# Patient Record
Sex: FEMALE | Race: BLACK OR AFRICAN AMERICAN | NOT HISPANIC OR LATINO | Employment: UNEMPLOYED | URBAN - METROPOLITAN AREA
[De-identification: names, ages, dates, MRNs, and addresses within clinical notes are randomized per-mention and may not be internally consistent; named-entity substitution may affect disease eponyms.]

---

## 2022-08-17 ENCOUNTER — LAB REQUISITION (OUTPATIENT)
Dept: LAB | Facility: CLINIC | Age: 77
End: 2022-08-17
Payer: COMMERCIAL

## 2022-08-17 DIAGNOSIS — R39.9 UNSPECIFIED SYMPTOMS AND SIGNS INVOLVING THE GENITOURINARY SYSTEM: ICD-10-CM

## 2022-08-17 PROCEDURE — 87086 URINE CULTURE/COLONY COUNT: CPT | Mod: ORL | Performed by: PHYSICIAN ASSISTANT

## 2022-08-19 LAB — BACTERIA UR CULT: NORMAL

## 2022-11-08 ENCOUNTER — HOSPITAL ENCOUNTER (EMERGENCY)
Facility: CLINIC | Age: 77
Discharge: HOME | End: 2022-11-08
Attending: EMERGENCY MEDICINE | Admitting: EMERGENCY MEDICINE
Payer: COMMERCIAL

## 2022-11-08 VITALS
DIASTOLIC BLOOD PRESSURE: 96 MMHG | SYSTOLIC BLOOD PRESSURE: 166 MMHG | TEMPERATURE: 98.2 F | BODY MASS INDEX: 29.37 KG/M2 | WEIGHT: 172 LBS | OXYGEN SATURATION: 97 % | HEART RATE: 95 BPM | RESPIRATION RATE: 18 BRPM | HEIGHT: 64 IN

## 2022-11-08 DIAGNOSIS — T24.211A PARTIAL THICKNESS BURN OF RIGHT THIGH, INITIAL ENCOUNTER: ICD-10-CM

## 2022-11-08 PROCEDURE — 90714 TD VACC NO PRESV 7 YRS+ IM: CPT | Performed by: EMERGENCY MEDICINE

## 2022-11-08 PROCEDURE — 250N000011 HC RX IP 250 OP 636: Performed by: EMERGENCY MEDICINE

## 2022-11-08 PROCEDURE — 250N000009 HC RX 250: Performed by: EMERGENCY MEDICINE

## 2022-11-08 PROCEDURE — 16020 DRESS/DEBRID P-THICK BURN S: CPT | Performed by: EMERGENCY MEDICINE

## 2022-11-08 PROCEDURE — 99284 EMERGENCY DEPT VISIT MOD MDM: CPT | Performed by: EMERGENCY MEDICINE

## 2022-11-08 PROCEDURE — 90471 IMMUNIZATION ADMIN: CPT | Performed by: EMERGENCY MEDICINE

## 2022-11-08 PROCEDURE — 99283 EMERGENCY DEPT VISIT LOW MDM: CPT | Mod: 25 | Performed by: EMERGENCY MEDICINE

## 2022-11-08 RX ORDER — OXYCODONE HYDROCHLORIDE 5 MG/1
5 TABLET ORAL EVERY 8 HOURS PRN
Qty: 12 TABLET | Refills: 0 | Status: SHIPPED | OUTPATIENT
Start: 2022-11-08 | End: 2022-11-11

## 2022-11-08 RX ORDER — BACITRACIN ZINC 500 [USP'U]/G
OINTMENT TOPICAL ONCE
Status: COMPLETED | OUTPATIENT
Start: 2022-11-08 | End: 2022-11-08

## 2022-11-08 RX ADMIN — CLOSTRIDIUM TETANI TOXOID ANTIGEN (FORMALDEHYDE INACTIVATED) AND CORYNEBACTERIUM DIPHTHERIAE TOXOID ANTIGEN (FORMALDEHYDE INACTIVATED) 0.5 ML: 5; 2 INJECTION, SUSPENSION INTRAMUSCULAR at 15:43

## 2022-11-08 RX ADMIN — BACITRACIN ZINC: 500 OINTMENT TOPICAL at 15:47

## 2022-11-08 ASSESSMENT — ACTIVITIES OF DAILY LIVING (ADL): ADLS_ACUITY_SCORE: 35
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
## 2024-10-07 ENCOUNTER — OFFICE VISIT (OUTPATIENT)
Dept: FAMILY MEDICINE | Facility: CLINIC | Age: 79
End: 2024-10-07
Payer: COMMERCIAL

## 2024-10-07 VITALS
SYSTOLIC BLOOD PRESSURE: 119 MMHG | OXYGEN SATURATION: 99 % | HEIGHT: 62 IN | HEART RATE: 75 BPM | WEIGHT: 177.6 LBS | BODY MASS INDEX: 32.68 KG/M2 | DIASTOLIC BLOOD PRESSURE: 78 MMHG | RESPIRATION RATE: 12 BRPM

## 2024-10-07 DIAGNOSIS — Z76.89 ENCOUNTER TO ESTABLISH CARE: Primary | ICD-10-CM

## 2024-10-07 DIAGNOSIS — K21.9 GASTROESOPHAGEAL REFLUX DISEASE WITHOUT ESOPHAGITIS: ICD-10-CM

## 2024-10-07 DIAGNOSIS — Z00.00 HEALTHCARE MAINTENANCE: ICD-10-CM

## 2024-10-07 DIAGNOSIS — E04.1 THYROID NODULE: ICD-10-CM

## 2024-10-07 DIAGNOSIS — R51.9 SCALP TENDERNESS: ICD-10-CM

## 2024-10-07 DIAGNOSIS — G44.209 TENSION HEADACHE: ICD-10-CM

## 2024-10-07 DIAGNOSIS — R53.83 OTHER FATIGUE: ICD-10-CM

## 2024-10-07 DIAGNOSIS — I10 BENIGN ESSENTIAL HYPERTENSION: ICD-10-CM

## 2024-10-07 LAB
ALBUMIN SERPL BCG-MCNC: 4 G/DL (ref 3.5–5.2)
ALP SERPL-CCNC: 101 U/L (ref 40–150)
ALT SERPL W P-5'-P-CCNC: 16 U/L (ref 0–50)
ANION GAP SERPL CALCULATED.3IONS-SCNC: 12 MMOL/L (ref 7–15)
AST SERPL W P-5'-P-CCNC: 27 U/L (ref 0–45)
BILIRUB SERPL-MCNC: 0.2 MG/DL
BUN SERPL-MCNC: 16.4 MG/DL (ref 8–23)
CALCIUM SERPL-MCNC: 9.1 MG/DL (ref 8.8–10.4)
CHLORIDE SERPL-SCNC: 100 MMOL/L (ref 98–107)
CHOLEST SERPL-MCNC: 239 MG/DL
CREAT SERPL-MCNC: 0.66 MG/DL (ref 0.51–0.95)
CRP SERPL-MCNC: 28.7 MG/L
EGFRCR SERPLBLD CKD-EPI 2021: 89 ML/MIN/1.73M2
ERYTHROCYTE [SEDIMENTATION RATE] IN BLOOD BY WESTERGREN METHOD: 37 MM/HR (ref 0–30)
FASTING STATUS PATIENT QL REPORTED: NO
FASTING STATUS PATIENT QL REPORTED: NO
GLUCOSE SERPL-MCNC: 91 MG/DL (ref 70–99)
HCO3 SERPL-SCNC: 24 MMOL/L (ref 22–29)
HDLC SERPL-MCNC: 47 MG/DL
LDLC SERPL CALC-MCNC: 172 MG/DL
NONHDLC SERPL-MCNC: 192 MG/DL
POTASSIUM SERPL-SCNC: 4.1 MMOL/L (ref 3.4–5.3)
PROT SERPL-MCNC: 7.4 G/DL (ref 6.4–8.3)
SODIUM SERPL-SCNC: 136 MMOL/L (ref 135–145)
TRIGL SERPL-MCNC: 102 MG/DL
TSH SERPL DL<=0.005 MIU/L-ACNC: 3.53 UIU/ML (ref 0.3–4.2)
VIT B12 SERPL-MCNC: 566 PG/ML (ref 232–1245)

## 2024-10-07 PROCEDURE — 82607 VITAMIN B-12: CPT

## 2024-10-07 PROCEDURE — 86140 C-REACTIVE PROTEIN: CPT

## 2024-10-07 PROCEDURE — 85652 RBC SED RATE AUTOMATED: CPT

## 2024-10-07 PROCEDURE — 80061 LIPID PANEL: CPT

## 2024-10-07 PROCEDURE — 99204 OFFICE O/P NEW MOD 45 MIN: CPT | Mod: GC

## 2024-10-07 PROCEDURE — 36415 COLL VENOUS BLD VENIPUNCTURE: CPT

## 2024-10-07 PROCEDURE — 84443 ASSAY THYROID STIM HORMONE: CPT

## 2024-10-07 PROCEDURE — 80053 COMPREHEN METABOLIC PANEL: CPT

## 2024-10-07 RX ORDER — LISINOPRIL AND HYDROCHLOROTHIAZIDE 10; 12.5 MG/1; MG/1
1 TABLET ORAL DAILY
COMMUNITY
End: 2024-10-07

## 2024-10-07 RX ORDER — LISINOPRIL AND HYDROCHLOROTHIAZIDE 10; 12.5 MG/1; MG/1
1 TABLET ORAL DAILY
Qty: 90 TABLET | Refills: 3 | Status: SHIPPED | OUTPATIENT
Start: 2024-10-07

## 2024-10-07 RX ORDER — PANTOPRAZOLE SODIUM 40 MG/1
40 TABLET, DELAYED RELEASE ORAL DAILY
Qty: 60 TABLET | Refills: 2 | Status: SHIPPED | OUTPATIENT
Start: 2024-10-07

## 2024-10-07 RX ORDER — SENNOSIDES 8.6 MG
1 CAPSULE ORAL EVERY 8 HOURS PRN
COMMUNITY
Start: 2023-09-26

## 2024-10-07 RX ORDER — ACETAMINOPHEN 500 MG
500-1000 TABLET ORAL EVERY 6 HOURS PRN
Qty: 180 TABLET | Refills: 3 | Status: SHIPPED | OUTPATIENT
Start: 2024-10-07

## 2024-10-07 RX ORDER — LORATADINE 10 MG/1
1 TABLET ORAL
COMMUNITY
Start: 2024-01-31

## 2024-10-07 NOTE — LETTER
October 8, 2024      Payton Agee  [address]   [facility] [address]        Dear ,    We are writing to inform you of your test results.    As we discussed at our last visit, please follow up with a clinic appointment to review these results further.[place]guillan Translation:Yumiko guzman uga wada hadalalma booqashadayadii ugu dambeysay, obed senae si aad king ugu eegto natiijooyinkaas.       Resulted Orders   Lipid panel reflex to direct LDL Non-fasting   Result Value Ref Range    Cholesterol 239 (H) <200 mg/dL    Triglycerides 102 <150 mg/dL    Direct Measure HDL 47 (L) >=50 mg/dL    LDL Cholesterol Calculated 172 (H) <100 mg/dL    Non HDL Cholesterol 192 (H) <130 mg/dL    Patient Fasting > 8hrs? No     Narrative    Cholesterol  Desirable: < 200 mg/dL  Borderline High: 200 - 239 mg/dL  High: >= 240 mg/dL    Triglycerides  Normal: < 150 mg/dL  Borderline High: 150 - 199 mg/dL  High: 200-499 mg/dL  Very High: >= 500 mg/dL    Direct Measure HDL  Female: >= 50 mg/dL   Male: >= 40 mg/dL    LDL Cholesterol  Desirable: < 100 mg/dL  Above Desirable: 100 - 129 mg/dL   Borderline High: 130 - 159 mg/dL   High:  160 - 189 mg/dL   Very High: >= 190 mg/dL    Non HDL Cholesterol  Desirable: < 130 mg/dL  Above Desirable: 130 - 159 mg/dL  Borderline High: 160 - 189 mg/dL  High: 190 - 219 mg/dL  Very High: >= 220 mg/dL   TSH with free T4 reflex   Result Value Ref Range    TSH 3.53 0.30 - 4.20 uIU/mL   CRP inflammation   Result Value Ref Range    CRP Inflammation 28.70 (H) <5.00 mg/L   Erythrocyte sedimentation rate auto   Result Value Ref Range    Erythrocyte Sedimentation Rate 37 (H) 0 - 30 mm/hr   Comprehensive metabolic panel   Result Value Ref Range    Sodium 136 135 - 145 mmol/L    Potassium 4.1 3.4 - 5.3 mmol/L    Carbon Dioxide (CO2) 24 22 - 29 mmol/L    Anion Gap 12 7 - 15 mmol/L    Urea Nitrogen 16.4 8.0 - 23.0 mg/dL    Creatinine 0.66 0.51 - 0.95 mg/dL    GFR Estimate 89 >60 mL/min/1.73m2       Comment:      eGFR calculated using 2021 CKD-EPI equation.    Calcium 9.1 8.8 - 10.4 mg/dL      Comment:      Reference intervals for this test were updated on 7/16/2024 to reflect our healthy population more accurately. There may be differences in the flagging of prior results with similar values performed with this method. Those prior results can be interpreted in the context of the updated reference intervals.    Chloride 100 98 - 107 mmol/L    Glucose 91 70 - 99 mg/dL    Alkaline Phosphatase 101 40 - 150 U/L    AST 27 0 - 45 U/L    ALT 16 0 - 50 U/L    Protein Total 7.4 6.4 - 8.3 g/dL    Albumin 4.0 3.5 - 5.2 g/dL    Bilirubin Total 0.2 <=1.2 mg/dL    Patient Fasting > 8hrs? No    Vitamin B12   Result Value Ref Range    Vitamin B12 566 232 - 1,245 pg/mL       If you have any questions or concerns, please call the clinic at the number listed above.       Sincerely,      Dilma Katz MD

## 2024-10-07 NOTE — TELEPHONE ENCOUNTER
FUTURE VISIT INFORMATION      FUTURE VISIT INFORMATION:  Date: 10/28/24  Time: 1pm  [place] [facility]ERRAL INFORMATION:  Referring provider:  Dilma Katz MD   Referring providers clinic:  [facility]  Reason for visit/diagnosis  thyroid nodules [place] [facility]     RECORDS REQUESTED FROM:       Clinic name Comments Records Status Imaging Status   [facility]  10/7/24- Ov Dilma Haider MD  Epic     Imaging  10/16/24- US thyroid  [facility]  PACS    [facility]  11/17/23- OV Mary Jane Mccollum MD   10/6/23- OV Pam Bear APRN, CNP    CE    [facility]   [address] [facility] [address]  11/28/23- [id number] - left thyroid     10/13/23- FNA- Case: [id number]  CE

## 2024-10-07 NOTE — PATIENT INSTRUCTIONS
Patient Education   Here is the plan from today's visit    1. Thyroid nodule    - TSH with free T4 reflex; Future  - US Thyroid; Future  - Adult ENT  Referral; Future    2. Tension headache    - acetaminophen (TYLENOL) 500 MG tablet; Take 1-2 tablets (500-1,000 mg) by mouth every 6 hours as needed for mild pain.  Dispense: 180 tablet; Refill: 3    3. Scalp tenderness      - CRP inflammation; Future  - Erythrocyte sedimentation rate auto; Future    4. Benign essential hypertension    - lisinopril-hydrochlorothiazide (ZESTORETIC) 10-12.5 MG tablet; Take 1 tablet by mouth daily.  Dispense: 90 tablet; Refill: 3    5. Gastroesophageal reflux disease without esophagitis    - pantoprazole (PROTONIX) 40 MG EC tablet; Take 1 tablet (40 mg) by mouth daily.  Dispense: 60 tablet; Refill: 2  - Comprehensive metabolic panel; Future    6. Healthcare maintenance    - Lipid panel reflex to direct LDL Non-fasting; Future          Please call or return to clinic if your symptoms don't go away.    Follow up plan  No follow-ups on file.    Thank you for coming to [facility] today.  Lab Testing:  **If you had lab testing today and your results are reassuring or normal they will be mailed to you or sent through [employer] within 7 days.   **If the lab tests need quick action we will call you with the results.  **If you are having labs done on a different day, please call [phone] to schedule at [facility] or [phone] for other [facility] locations. Labs do not offer walk-in appointments.  The phone number we will call with results is # [phone] (home) . If this is not the best number please call our clinic and change the number.  Medication Refills:  If you need any refills please call your pharmacy and they will contact us.   If you need to  your refill at a new pharmacy, please contact the new pharmacy directly. The new pharmacy will help you get your medications transferred faster.    Scheduling:  If you have any concerns about today's visit or wish to schedule another appointment please call our office during normal business hours [phone] (8-5:00 M-F). If you can no longer make a scheduled visit, please cancel via [email or web address] or call us to cancel.   If a referral was made to an [facility] specialty provider and you do not get a call from central scheduling, please refer to directions on your visit summary or call our office during normal business hours for assistance.   If a Mammogram was ordered for you at the Breast Center call [phone] to schedule or change your appointment.  If you had an XRay/CT/Ultrasound/MRI ordered the number is [phone] to schedule or change your radiology appointment.   [facility] has limited ultrasound appointments available on Wednesdays, if you would like your ultrasound at [facility], please call [phone] to schedule.   Medical Concerns:  If you have urgent medical concerns please call [phone] at any time of the day.    Dilma Katz MD

## 2024-10-07 NOTE — PROGRESS NOTES
Assessment & Plan     Encounter to establish care  Patient here for establish care  - will plan to    Thyroid nodule  History of subtotal thyroidectomy on 11/18/2023 was supposed to have PCP follow up and TSH appears this has not happened. Will obtain TSH today and thyroid US given nodule palpable on exam. Would also likely need ENT involvement (will place referral today for schdule purposes) and to determine next steps pending results of US.  - TSH with free T4 reflex; Future  - US Thyroid; Future  - Adult ENT  Referral; Future  - TSH with free T4 reflex    Tension headache  Appears tension headaches given neck and back tenderness. Recommend treat with Tylenol for now. May benefit from OMT/PT.  - acetaminophen (TYLENOL) 500 MG tablet; Take 1-2 tablets (500-1,000 mg) by mouth every 6 hours as needed for mild pain.    Scalp tenderness  Current scalp tenderness and possible jaw claudication on history today will obtain ESR/CRP to rule out GCA.  - CRP inflammation; Future  - Erythrocyte sedimentation rate auto; Future    Benign essential hypertension  Refill patient's HTN medications. HTN at goal today.  - lisinopril-hydrochlorothiazide (ZESTORETIC) 10-12.5 MG tablet; Take 1 tablet by mouth daily.    Gastroesophageal reflux disease without esophagitis  History of  uncomplicated reflux previously controlled with protonix - refilled. Last EGD 2021. Will obtain CMP.  - pantoprazole (PROTONIX) 40 MG EC tablet; Take 1 tablet (40 mg) by mouth daily.  - Comprehensive metabolic panel; Future  - Comprehensive metabolic panel    Healthcare maintenance  USPSTF screening ordered as below   - Lipid panel reflex to direct LDL Non-fasting    Other fatigue  Shares fatigue and some memory concerns. Will obtain B12.  - Vitamin B12; Future    Memory  Insufficient time to assess today- recommend SLUMS next visit.        BMI  Estimated body mass index is 32.48 kg/m  as calculated from the following:    Height as of this  "encounter: 1.575 m (5' 2\").    Weight as of this encounter: 80.6 kg (177 lb 9.6 oz).         See Patient Instructions    Return in about 2 weeks (around 10/21/2024) for Follow up, with me headaches.    Jaimie Cain is a 79 year old, presenting for the following health issues:  Establish Care (Abdominal pain- burning at night, pain in the throat)      10/7/2024     9:10 AM   Additional Questions   Roomed by gomez   Accompanied by staff member         10/7/2024    Information    services provided? Yes   Language Maldivian   Type of interpretation provided Telephone    name [facility]    Agency [employer]        HPI   Here for establish care  - attempting to review clinical care notes    Patient concerns  Had thyroid removed and still having some discomfort  - patient states was removed   - fatigue (sleeps poorly)  - feels cold  - had 2 thyroid nodules removed previously 1 nodule was unable to be removed, not sure who removed these   - states that pain started  - pain with swallow  - not sure when nodules was removed  - got better after surgery so not sure if the reflux could be contibuting is inside the throat  - per chart review saw general surgery \"LEFT THYROID LOBE, ISTHMUS, AND PORTION OF RIGHT LOBE Tissue Left Thyroid PATH TISSUE EXAM\" 11/18/2023. 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    " "    The Z-line was regular and was found 32 cm from the incisors.        A 2 cm hiatal hernia was present.        The exam of the stomach was otherwise normal.        The cardia and gastric fundus were normal on retroflexion.        Biopsies were taken with a cold forceps in the gastric body, at the        incisura and in the gastric antrum for Helicobacter pylori testing.        The examined duodenum was normal\"    Hypertension  - needs refill lisinopril-hydrochlorothiazide 10-12.5    Has also had scalp tightness hard  - has also been having severe headaches  - has back tightness  -  also has jaw soreness (not when eating though)        Objective    /78 (BP Location: Left arm, Patient Position: Sitting, Cuff Size: Adult Regular)   Pulse 75   Resp 12   Ht 1.575 m (5' 2\")   Wt 80.6 kg (177 lb 9.6 oz)   SpO2 99%   BMI 32.48 kg/m    Body mass index is 32.48 kg/m .  Physical Exam   General: No acute distress  Head: No obvious trauma to head.  Ears, Nose, Throat:  External ears normal.  Nose normal.  Normal age related dentition.  Eyes:  Conjunctivae clear.  Pupils are equal, round, and reactive. Cataracts. No pain to palpation of jaw.  Neck: Normal range of motion.  Neck supple. Left sided thyroid nodule.  CV: Regular rate and rhythm.  No murmurs.      Respiratory: Effort normal and breath sounds normal.  No wheezing or crackles.   Gastrointestinal: Soft.  No distension. Slight epigastric tenderness.  There is no rigidity, no rebound and no guarding.   Musculoskeletal: Normal range of motion of bilateral knees. Bilateral lower extremity edema.  Neuro: Alert. Moving all extremities appropriately.  Normal speech.    Skin: Visible skin not covered by clothing  is warm and dry.  No rash noted.   Psych: Normal mood and affect. Behavior is normal.              Signed Electronically by: Dilma Katz MD    "

## 2024-10-08 NOTE — RESULT ENCOUNTER NOTE
Lab results reassuring against GCA. The American College of Rheumatology 2022 EULAR criteria items and weights are: positive temporal artery biopsy or temporal artery halo sign on ultrasound (+5); erythrocyte sedimentation rate of at least 50 mm/hour or C-reactive protein of at least 10 mg/liter (+3); sudden visual loss (+3); morning stiffness in shoulders or neck, jaw or tongue claudication, new temporal headache, scalp tenderness, temporal artery abnormality on vascular examination, bilateral axillary involvement on imaging, and fluorodeoxyglucose-positron emission tomography activity throughout the aorta (+2 each). A patient could be classified as having GCA with a cumulative score of of at least 6 points.    Patient potentially has total score of 5 points (scalp tenderness and CRP >10mg/L). Patient has more likely etiology of scalp tenderness with description of symptoms c/w tension headache. Thus, no need for temporal artery biopsy or steroids at this time. Anticipatory guidance/return precautions were given in clinic. Will send result letter.

## 2024-10-16 ENCOUNTER — ANCILLARY PROCEDURE (OUTPATIENT)
Dept: ULTRASOUND IMAGING | Facility: CLINIC | Age: 79
End: 2024-10-16
Attending: FAMILY MEDICINE
Payer: COMMERCIAL

## 2024-10-16 DIAGNOSIS — E04.1 THYROID NODULE: ICD-10-CM

## 2024-10-16 PROCEDURE — 76536 US EXAM OF HEAD AND NECK: CPT | Mod: GC | Performed by: STUDENT IN AN ORGANIZED HEALTH CARE EDUCATION/TRAINING PROGRAM

## 2024-10-16 NOTE — NURSING NOTE
Due to patient being non-English speaking/uses sign language, an  was used for this visit. Only for face-to-face interpretation by an external agency, date and length of interpretation can be found on the scanned worksheet.     name: Sadaf   Agency: [employer]  Language: Citizen of the [place]   Telephone number: .  Type of interpretation: Face-to-face, spoken

## 2024-10-16 NOTE — LETTER
October 18, 2024      Payton Agee  [address]   [facility] [address]        Dear ,    We are writing to inform you of your test results.    Your thyroid ultrasound is overall normal. You will need a follow up ultrasound in one year.    As we discussed at our last visit, please follow up with a clinic appointment to review these results further.Kyrgyz Translation:Yumiko guzman uga wada josselinalalma booqashadayadii ugu dambeobed salase si aad king ugu eegto natiijooyinkaas.       Resulted Orders   US Thyroid    Narrative    US THYROID 10/16/2024 2:22 PM    COMPARISON: None    HISTORY: Thyroid nodule    FINDINGS:   Thyroid parenchyma: homogenous  The right lobe of the thyroid measures: 4.4 x 2.2 x 1.9 cm  The left lobe of the thyroid measures: Surgically absent in setting of  subtotal thyroidectomy.  The thyroid isthmus measures: Surgically absent and setting of  subtotal thyroidectomy.    Nodule 1:  Lobe: Right  Location: Superior  Size: 0.6 x 0.6 x 0.4 cm  Composition: Mixed cystic and solid (1 point)  Echogenicity: Hyperechoic or isoechoic (1 point)  Shape: Wider than tall (0 points)  Margin: Smooth (0 points)  Echogenic Foci: None or large comet tail artifact (0 points)  Stability: Not previously imaged  TIRADS: TR2 (1-2 points) Not suspicious    Nodule 2:  Lobe: Right  Location: Superolateral  Size: 0.8 x 0.5 x 0.5 cm  Composition: Solid or almost completely solid (2 points)  Echogenicity: Hyperechoic or isoechoic (1 point)  Shape: Wider than tall (0 points)  Margin: Ill-defined (0 points)  Echogenic Foci: None or large comet tail artifact (0 points)  Stability: Not previously imaged  TIRADS: TR3 (3 points)     Nodule 3:  Lobe: Right  Location: Mid lobe  Size: 1.5 x 1.5 x 1.3 cm  Composition: Mixed cystic and solid (1 point)  Echogenicity: Hyperechoic or isoechoic (1 point)  Shape: Wider than tall (0 points)  Margin: Smooth (0 points)  Echogenic Foci: None or large comet tail  artifact (0 points)  Stability: Not previously imaged  TIRADS: TR2 (1-2 points) Not suspicious    Nodule 4:  Lobe: Right  Location: Inferior  Size: 1.2 x 1.3 x 1.0 cm  Composition: Solid or almost completely solid (2 points)  Echogenicity: Hyperechoic or isoechoic (1 point)  Shape: Wider than tall (0 points)  Margin: Smooth (0 points)  Echogenic Foci: Macro-calcifications (1 point)  Stability: Not previously imaged  TIRADS: TR4 (4-6 points)         Impression    Impression:  Thyroid nodules as detailed above. Recommend repeat ultrasound in one  year for interval assessment of nodule TR4 nodule #4 as per TI-RADS  criteria.    ACR TI-RADS recommendations  TR2 (2 points) & TR1 (0 points) -No FNA or follow-up  TR3 (3 points) - FNA if ? 2.5cm, follow-up if 1.5 -2.4 cm in 1, 3 and  5 years  TR4 (4-6 points) - FNA if ? 1.5cm, follow-up if 1 -1.4 cm in 1, 2, 3  and 5 years  TR5 (?7 points) - FNA if ? 1cm, follow-up if 0.5 -0.9 cm every year  for 5 years     I have personally reviewed the examination and initial interpretation  and I agree with the findings.    YOLANDA HANNON DO         SYSTEM ID:  [id number]       If you have any questions or concerns, please call the clinic at the number listed above.       Sincerely,      Dilma Katz MD

## 2024-10-28 ENCOUNTER — PRE VISIT (OUTPATIENT)
Dept: OTOLARYNGOLOGY | Facility: CLINIC | Age: 79
End: 2024-10-28

## 2024-10-28 ENCOUNTER — OFFICE VISIT (OUTPATIENT)
Dept: FAMILY MEDICINE | Facility: CLINIC | Age: 79
End: 2024-10-28
Payer: COMMERCIAL

## 2024-10-28 ENCOUNTER — OFFICE VISIT (OUTPATIENT)
Dept: OTOLARYNGOLOGY | Facility: CLINIC | Age: 79
End: 2024-10-28
Attending: FAMILY MEDICINE
Payer: COMMERCIAL

## 2024-10-28 VITALS
WEIGHT: 178 LBS | TEMPERATURE: 97.6 F | SYSTOLIC BLOOD PRESSURE: 130 MMHG | RESPIRATION RATE: 16 BRPM | HEART RATE: 75 BPM | DIASTOLIC BLOOD PRESSURE: 78 MMHG | BODY MASS INDEX: 32.56 KG/M2 | OXYGEN SATURATION: 99 %

## 2024-10-28 VITALS — WEIGHT: 178 LBS | BODY MASS INDEX: 32.76 KG/M2 | HEIGHT: 62 IN

## 2024-10-28 DIAGNOSIS — R79.82 CRP ELEVATED: ICD-10-CM

## 2024-10-28 DIAGNOSIS — K21.00 GASTROESOPHAGEAL REFLUX DISEASE WITH ESOPHAGITIS, UNSPECIFIED WHETHER HEMORRHAGE: Primary | ICD-10-CM

## 2024-10-28 DIAGNOSIS — G44.209 TENSION HEADACHE: ICD-10-CM

## 2024-10-28 DIAGNOSIS — E04.1 THYROID NODULE: ICD-10-CM

## 2024-10-28 DIAGNOSIS — Z60.3 IMMIGRANT WITH LANGUAGE DIFFICULTY: ICD-10-CM

## 2024-10-28 LAB
BASOPHILS # BLD AUTO: 0 10E3/UL (ref 0–0.2)
BASOPHILS NFR BLD AUTO: 1 %
CRP SERPL-MCNC: 25.32 MG/L
EOSINOPHIL # BLD AUTO: 0.3 10E3/UL (ref 0–0.7)
EOSINOPHIL NFR BLD AUTO: 7 %
ERYTHROCYTE [DISTWIDTH] IN BLOOD BY AUTOMATED COUNT: 13.5 % (ref 10–15)
HCT VFR BLD AUTO: 34.9 % (ref 35–47)
HGB BLD-MCNC: 11.3 G/DL (ref 11.7–15.7)
IMM GRANULOCYTES # BLD: 0 10E3/UL
IMM GRANULOCYTES NFR BLD: 0 %
LYMPHOCYTES # BLD AUTO: 0.9 10E3/UL (ref 0.8–5.3)
LYMPHOCYTES NFR BLD AUTO: 20 %
MCH RBC QN AUTO: 28.5 PG (ref 26.5–33)
MCHC RBC AUTO-ENTMCNC: 32.4 G/DL (ref 31.5–36.5)
MCV RBC AUTO: 88 FL (ref 78–100)
MONOCYTES # BLD AUTO: 0.4 10E3/UL (ref 0–1.3)
MONOCYTES NFR BLD AUTO: 9 %
NEUTROPHILS # BLD AUTO: 2.8 10E3/UL (ref 1.6–8.3)
NEUTROPHILS NFR BLD AUTO: 63 %
PLATELET # BLD AUTO: 264 10E3/UL (ref 150–450)
RBC # BLD AUTO: 3.96 10E6/UL (ref 3.8–5.2)
WBC # BLD AUTO: 4.3 10E3/UL (ref 4–11)

## 2024-10-28 PROCEDURE — 36415 COLL VENOUS BLD VENIPUNCTURE: CPT

## 2024-10-28 PROCEDURE — 99203 OFFICE O/P NEW LOW 30 MIN: CPT | Performed by: SURGERY

## 2024-10-28 PROCEDURE — 86140 C-REACTIVE PROTEIN: CPT

## 2024-10-28 PROCEDURE — 99214 OFFICE O/P EST MOD 30 MIN: CPT | Mod: GC

## 2024-10-28 PROCEDURE — 85025 COMPLETE CBC W/AUTO DIFF WBC: CPT

## 2024-10-28 RX ORDER — OMEPRAZOLE 40 MG/1
40 CAPSULE, DELAYED RELEASE ORAL 2 TIMES DAILY
Qty: 60 CAPSULE | Refills: 0 | Status: SHIPPED | OUTPATIENT
Start: 2024-10-28

## 2024-10-28 RX ORDER — SIMETHICONE 40MG/0.6ML
40 SUSPENSION, DROPS(FINAL DOSAGE FORM)(ML) ORAL 4 TIMES DAILY PRN
Qty: 45 ML | Refills: 5 | Status: SHIPPED | OUTPATIENT
Start: 2024-10-28

## 2024-10-28 SDOH — SOCIAL STABILITY - SOCIAL INSECURITY: ACCULTURATION DIFFICULTY: Z60.3

## 2024-10-28 NOTE — LETTER
October 30, 2024      Payton Agee  [address]   [facility] [address]        Dear ,    We are writing to inform you of your test results.    Your results are overall reassuring and unchanged from previous    As we discussed at our last visit, please follow up with a clinic appointment to review these results further.Paraguayan Translation:Sida aan uga wada hadalnay booqashadayadii ugu dambeysay, mehdilan qabso ballan kale si aad king ugu eegto natiijooyinkaas.       Resulted Orders   CRP inflammation   Result Value Ref Range    CRP Inflammation 25.32 (H) <5.00 mg/L   CBC with platelets and differential   Result Value Ref Range    WBC Count 4.3 4.0 - 11.0 10e3/uL    RBC Count 3.96 3.80 - 5.20 10e6/uL    Hemoglobin 11.3 (L) 11.7 - 15.7 g/dL    Hematocrit 34.9 (L) 35.0 - 47.0 %    MCV 88 78 - 100 fL    MCH 28.5 26.5 - 33.0 pg    MCHC 32.4 31.5 - 36.5 g/dL    RDW 13.5 10.0 - 15.0 %    Platelet Count 264 150 - 450 10e3/uL    % Neutrophils 63 %    % Lymphocytes 20 %    % Monocytes 9 %    % Eosinophils 7 %    % Basophils 1 %    % Immature Granulocytes 0 %    Absolute Neutrophils 2.8 1.6 - 8.3 10e3/uL    Absolute Lymphocytes 0.9 0.8 - 5.3 10e3/uL    Absolute Monocytes 0.4 0.0 - 1.3 10e3/uL    Absolute Eosinophils 0.3 0.0 - 0.7 10e3/uL    Absolute Basophils 0.0 0.0 - 0.2 10e3/uL    Absolute Immature Granulocytes 0.0 <=0.4 10e3/uL       If you have any questions or concerns, please call the clinic at the number listed above.       Sincerely,      Dilma Katz MD

## 2024-10-28 NOTE — PROGRESS NOTES
Assessment & Plan     Gastroesophageal reflux disease with esophagitis, unspecified whether hemorrhage  patient has known history of reactive gastropathy & currently symptoms are poorly controlled with pantoprazole 40. Patient feels that symptoms were previously better controlled with omeprazole, but was still having pain. Thus, will increase omeprazole to 40 BID. Did also provide extensive counseling on food to avoid. Patient currently does state having metallic/bloodied taste. Recommended EGD today, patient adamantly refused this. Did discuss risks of missing more serious pathology underlying patients unremitting symptoms without expeditiously obtaining EGD. However, patient still declines understands risks of missing more serious pathology by not obtaining EGD sooner and delayed treatment. However, patient states amenable to EGD in four weeks if symptoms are not resolved with increased dose of omeprazole. Given that patient has had no weight loss, melena, hematochezia, vomiting blood, or severe pain on exam to lead to ED referral today will plan for follow-up in four weeks.  - simethicone (MYLICON) 40 MG/0.6ML suspension; Take 0.6 mLs (40 mg) by mouth 4 times daily as needed for cramping.  - omeprazole (PRILOSEC) 40 MG DR capsule; Take 1 capsule (40 mg) by mouth 2 times daily.  - CBC with platelets differential; Future  -educated patient on emergent symptoms to evaluate in ED  -strict return to care precautions    Immigrant with language difficulty  history of language difficulty, and immigration to [place]. Needs help with evaluation for waiver for immigration test. Will refer to psychology  - Adult Mental Health  Referral; Future    Headache  patient's headache appears to be cervical genic headache with paraspinal tenderness. Recommend patient to trial OMT  -in-person  to help patient schedule OMT at   - continue PRN tylenol    CRP elevated  patient had mildly elevated CRP  "at last visit of unclear etiology. Most likely mildly elevated in the context of age   illness. However, will obtain CRP recheck to ensure downtrend   - CRP inflammation; Future      Thyroid nodule  History of subtotal thyroidectomy on 11/18/2023 was supposed to have PCP follow up (rechecked in early October 2024). TSH was within normal limits on 10 /7/2024. 10/16/2024 thyroid nodule overall reassuring, but recommendation was for repeat testing in one year for interval assessment. Patient also has ENT appointment scheduled today.  -Thyroid ultrasound October 2025  -continue with plan for ENT visit today  -no medication changes    BMI  Estimated body mass index is 32.56 kg/m  as calculated from the following:    Height as of 10/7/24: 1.575 m (5' 2\").    Weight as of this encounter: 80.7 kg (178 lb).         See Patient Instructions    Return in about 4 weeks (around 11/25/2024) for Follow up, with me GERD.    The longitudinal plan of care for the diagnosis(es)/condition(s) as documented were addressed during this visit. Due to the added complexity in care, I will continue to support Payton in the subsequent management and with ongoing continuity of care.    Subjective   Payton is a 79 year old, presenting for the following health issues:  Clinic Care Coordination - Follow-up (Results of blood testing. Medication review )      10/28/2024     8:26 AM   Additional Questions   Roomed by Marilin   Accompanied by          10/28/2024    Information    services provided? Yes   Language American   Type of interpretation provided Face-to-face    name Nichole    Ag[employer]     Concerns  - Wants to review blood testing results are within normal  - Medication concern: reflux  - Reflux  - was concerned about medication for pantoprazole 40EC feels no change with this  - patient states was taking a serum in the past that didn't help  - has severe reflux at night " "sometimes metalic taste of blood  - not losing weight, not vomiting blood, no black stool, no red blood  - early morning burning  - burps every day, reflux wakes at night  - per chart recview EGD on 6/28/2021.    \"Findings:       The examined esophagus was normal.        The Z-line was regular and was found 32 cm from the incisors.        A 2 cm hiatal hernia was present.        The exam of the stomach was otherwise normal.        The cardia and gastric fundus were normal on retroflexion.        Biopsies were taken with a cold forceps in the gastric body, at the        incisura and in the gastric antrum for Helicobacter pylori testing.        The examined duodenum was normal. \"  - Path 6/28/2021 negaive for H pylori and c/w reactive gastropathy  - H pylori stool negative 4/6/2021  - No other medication questions    Headaches  - still present intermittently  - no aura    Brief Mini Cog  Immediate recall 3/3  1 minute recall 2/3                  Objective    /78   Pulse 75   Temp 97.6  F (36.4  C) (Oral)   Resp 16   Wt 80.7 kg (178 lb)   SpO2 99%   BMI 32.56 kg/m    Body mass index is 32.56 kg/m .  Wt Readings from Last 5 Encounters:   10/28/24 80.7 kg (178 lb)   10/07/24 80.6 kg (177 lb 9.6 oz)   11/08/22 78 kg (172 lb)      Physical Exam     GENERAL: alert and no distress  HEENT: Eyes grossly normal to inspection, conjunctivae normal,  sclerae normal, external ears normal, and nose normal  RESP: CTAB, no increased work of breathing, speaking in full sentences  CV: S1, S2, RRR  GI: epigastric tenderness to palpation, soft, non distended  MS: tenderness to palpation of paraspinal muscles thoracic spine, no pain to palpation of cervical or thoracic spine  NEURO: alert, moving extremities appropriately.negative spurlin  PSYCH: mentation appears normal, affect normal/bright   EXT: no edema        Signed Electronically by: Dilma Katz MD    "

## 2024-10-28 NOTE — PATIENT INSTRUCTIONS
Avoid foods that make your symptoms worse. These may include chocolate, mint, alcohol, pepper, spicy foods, high-fat foods, or drinks with caffeine in them, such as tea, coffee, cooper, or energy drinks. If your symptoms are worse after you eat a certain food, you may want to stop eating it to see if your symptoms get better.  Try to quit smoking or chewing tobacco, or cut back as much as you can. If you need help quitting, talk to your doctor about quit-tobacco programs and medicines. These can increase your chances of quitting for good.  If you have GERD symptoms while trying to sleep, raise the head of your bed 6 to 8 inches by putting the frame on blocks or placing a foam wedge under the head of your mattress. (Adding extra pillows does not work.)  Do not wear tight clothing around your middle.  When should you call for help?   Call 911  anytime you think you may need emergency care. For example, call if:    You passed out (lost consciousness).   Call your doctor now or seek immediate medical care if:    You have new or worse belly pain.     Your stools are black and tarlike or have streaks of blood.     You vomit blood.   Watch closely for changes in your health, and be sure to contact your doctor if:    Your symptoms have not improved after 2 weeks.     Food seems to catch in your throat or chest.     What Is OMT (Osteopathic Manipulative Treatment)?    As part of their education, DOs (doctor of osteopathic medicine) receive special training in the musculoskeletal system (your nerves, bones and muscles).     OMT involves using the hands to diagnose, treat and prevent illness or injury.  Using OMT, your osteopathic physician will move your muscles and joints using techniques including stretching, gentle pressure and resistance.     OMT can help people of all ages and backgrounds. In addition to muscle or joint pain, it can be used to treat a variety of conditions such as asthma, sinus pain, migraines and carpal  "tunnel syndrome.     For more information, please visit [email or web address]    How to Schedule OMT :  Please make an appointment for \"OMT\" with the .  Please inform them you are scheduling for OMT with any DO provider. A list is provided below:     Ermelinda Flores, DO  David Murray, DO  Filomena Suh, DO  Jami Thompson, DO  Juan Ramon Camarillo, DO  Abi Christiansen, DO  Radha Cai, DO  Pierre Wolfe, DO  Suzi Waters, DO    Care after OMT:   Similar to other manual therapies, pain can sometimes be increased in the 24 hours after treatment. You can manage this at home by drinking plenty of fluids and using tylenol and/or ibuprofen as needed for pain.   Please return in 2-4 weeks or as directed by your physician today for further osteopathic evaluation.        Patient Education   Here is the plan from today's visit    1. Gastroesophageal reflux disease with esophagitis, unspecified whether hemorrhage (Primary)    - simethicone (MYLICON) 40 MG/0.6ML suspension; Take 0.6 mLs (40 mg) by mouth 4 times daily as needed for cramping.  Dispense: 45 mL; Refill: 5  - omeprazole (PRILOSEC) 40 MG DR capsule; Take 1 capsule (40 mg) by mouth 2 times daily.  Dispense: 60 capsule; Refill: 0  - CBC with platelets differential; Future    2. Immigrant with language difficulty    - Adult Mental Health  Referral; Future    3. CRP elevated    - CRP inflammation; Future        Please call or return to clinic if your symptoms don't go away.    Follow up plan  Return in about 4 weeks (around 11/25/2024) for Follow up, with me GERD.  OMT soonest able  Thank you for coming to [facility] today.  Lab Testing:  **If you had lab testing today and your results are reassuring or normal they will be mailed to you or sent through [employer] within 7 days.   **If the lab tests need quick action we will call you with the results.  **If you are having labs done on a different day, please call [phone] to schedule at " [facility] or [phone] for other [facility] locations. Labs do not offer walk-in appointments.  The phone number we will call with results is # [phone] (home) . If this is not the best number please call our clinic and change the number.  Medication Refills:  If you need any refills please call your pharmacy and they will contact us.   If you need to  your refill at a new pharmacy, please contact the new pharmacy directly. The new pharmacy will help you get your medications transferred faster.   Scheduling:  If you have any concerns about today's visit or wish to schedule another appointment please call our office during normal business hours [phone] (8-5:00 M-F). If you can no longer make a scheduled visit, please cancel via [employer] or call us to cancel.   If a referral was made to an [facility] specialty provider and you do not get a call from central scheduling, please refer to directions on your visit summary or call our office during normal business hours for assistance.   If a Mammogram was ordered for you at the [facility] call [phone] to schedule or change your appointment.  If you had an XRay/CT/Ultrasound/MRI ordered the number is [phone] to schedule or change your radiology appointment.   [facility] has limited ultrasound appointments available on Wednesdays, if you would like your ultrasound at [facility], please call [phone] to schedule.   Medical Concerns:  If you have urgent medical concerns please call [phone] at any time of the day.    Dilma Katz MD

## 2024-10-28 NOTE — PROGRESS NOTES
Assessment & Plan     Gastroesophageal reflux disease with esophagitis, unspecified whether hemorrhage  patient has known history of reactive gastropathy & currently symptoms are poorly controlled with pantoprazole 40. Patient feels that symptoms were previously better controlled with omeprazole, but was still having pain. Thus, will increase omeprazole to 40 BID. Did also provide extensive counseling on food to avoid. Patient currently does state having metallic/bloodied taste. Recommended EGD today, patient adamantly refused this. Did discuss risks of missing more serious pathology underlying patients unremitting symptoms without expeditiously obtaining EGD. However, patient still declines understands risks of missing more serious pathology by not obtaining EGD sooner and delayed treatment. However, patient states amenable to EGD in four weeks if symptoms are not resolved with increased dose of omeprazole. Given that patient has had no weight loss, melena, hematochezia, vomiting blood, or severe pain on exam to lead to ED referral today will plan for follow-up in four weeks.  - simethicone (MYLICON) 40 MG/0.6ML suspension; Take 0.6 mLs (40 mg) by mouth 4 times daily as needed for cramping.  - omeprazole (PRILOSEC) 40 MG DR capsule; Take 1 capsule (40 mg) by mouth 2 times daily.  - CBC with platelets differential; Future  -educated patient on emergent symptoms to evaluate in ED  -strict return to care precautions    Immigrant with language difficulty  Cognitive Difficultuty  Patient has immigration to [place]. Also mini cog word recall 2/3 can't do clock face (but does know know that). Concern for cognitive baseline issue - will refer to psychology.  - Adult Mental Health  Referral; Future    Headache  patient's headache appears to be cervical genic headache with paraspinal tenderness. Recommend patient to trial OMT  -in-person  to help patient schedule OMT at   - continue PRN  "tylenol    CRP elevated  patient had mildly elevated CRP at last visit of unclear etiology. Most likely mildly elevated in the context of age   illness. However, will obtain CRP recheck to ensure downtrend   - CRP inflammation; Future      Thyroid nodule  History of subtotal thyroidectomy on 11/18/2023 was supposed to have PCP follow up (rechecked in early October 2024). TSH was within normal limits on 10 /7/2024. 10/16/2024 thyroid nodule overall reassuring, but recommendation was for repeat testing in one year for interval assessment. Patient also has ENT appointment scheduled today.  -Thyroid ultrasound October 2025  -continue with plan for ENT visit today  -no medication changes    BMI  Estimated body mass index is 32.56 kg/m  as calculated from the following:    Height as of 10/7/24: 1.575 m (5' 2\").    Weight as of this encounter: 80.7 kg (178 lb).         See Patient Instructions    Return in about 4 weeks (around 11/25/2024) for Follow up, with me GERD.    The longitudinal plan of care for the diagnosis(es)/condition(s) as documented were addressed during this visit. Due to the added complexity in care, I will continue to support Payton in the subsequent management and with ongoing continuity of care.    Subjective   Payton is a 79 year old, presenting for the following health issues:  Clinic Care Coordination - Follow-up (Results of blood testing. Medication review )        10/28/2024     8:26 AM   Additional Questions   Roomed by Marilin   Accompanied by          10/28/2024    Information    services provided? Yes   Language Peruvian   Type of interpretation provided Face-to-face    name Nichole    Agency [employer]     Concerns  - Wants to review blood testing results are within normal  - Medication concern: reflux  - Reflux  - was concerned about medication for pantoprazole 40EC feels no change with this  - patient states was taking a serum in " "the past that didn't help  - has severe reflux at night sometimes metalic taste of blood  - not losing weight, not vomiting blood, no black stool, no red blood  - early morning burning  - burps every day, reflux wakes at night  - per chart recview EGD on 6/28/2021.    \"Findings:       The examined esophagus was normal.        The Z-line was regular and was found 32 cm from the incisors.        A 2 cm hiatal hernia was present.        The exam of the stomach was otherwise normal.        The cardia and gastric fundus were normal on retroflexion.        Biopsies were taken with a cold forceps in the gastric body, at the        incisura and in the gastric antrum for Helicobacter pylori testing.        The examined duodenum was normal. \"  - Path 6/28/2021 negaive for H pylori and c/w reactive gastropathy  - H pylori stool negative 4/6/2021  - No other medication questions    Headaches  - still present intermittently  - no aura    Brief Mini Cog  Immediate recall 3/3  1 minute recall 2/3                  Objective    /78   Pulse 75   Temp 97.6  F (36.4  C) (Oral)   Resp 16   Wt 80.7 kg (178 lb)   SpO2 99%   BMI 32.56 kg/m    Body mass index is 32.56 kg/m .  Wt Readings from Last 5 Encounters:   10/28/24 80.7 kg (178 lb)   10/28/24 80.7 kg (178 lb)   10/07/24 80.6 kg (177 lb 9.6 oz)   11/08/22 78 kg (172 lb)      Physical Exam     GENERAL: alert and no distress  HEENT: Eyes grossly normal to inspection, conjunctivae normal,  sclerae normal, external ears normal, and nose normal  RESP: CTAB, no increased work of breathing, speaking in full sentences  CV: S1, S2, RRR  GI: epigastric tenderness to palpation, soft, non distended  MS: tenderness to palpation of paraspinal muscles thoracic spine, no pain to palpation of cervical or thoracic spine  NEURO: alert, moving extremities appropriately.negative spurlin  PSYCH: mentation appears normal, affect normal/bright   EXT: no edema        Signed Electronically by: " Dilma Katz MD

## 2024-10-28 NOTE — LETTER
October 28, 2024      Payton Agee  [address]   [place] [address]        Dear ,    We are writing to inform you of your test results.    Your test results fall within the expected range(s) or remain unchanged from previous results.  Please continue with current treatment plan.    Resulted Orders   CBC with platelets and differential   Result Value Ref Range    WBC Count 4.3 4.0 - 11.0 10e3/uL    RBC Count 3.96 3.80 - 5.20 10e6/uL    Hemoglobin 11.3 (L) 11.7 - 15.7 g/dL    Hematocrit 34.9 (L) 35.0 - 47.0 %    MCV 88 78 - 100 fL    MCH 28.5 26.5 - 33.0 pg    MCHC 32.4 31.5 - 36.5 g/dL    RDW 13.5 10.0 - 15.0 %    Platelet Count 264 150 - 450 10e3/uL    % Neutrophils 63 %    % Lymphocytes 20 %    % Monocytes 9 %    % Eosinophils 7 %    % Basophils 1 %    % Immature Granulocytes 0 %    Absolute Neutrophils 2.8 1.6 - 8.3 10e3/uL    Absolute Lymphocytes 0.9 0.8 - 5.3 10e3/uL    Absolute Monocytes 0.4 0.0 - 1.3 10e3/uL    Absolute Eosinophils 0.3 0.0 - 0.7 10e3/uL    Absolute Basophils 0.0 0.0 - 0.2 10e3/uL    Absolute Immature Granulocytes 0.0 <=0.4 10e3/uL       If you have any questions or concerns, please call the clinic at the number listed above.       Sincerely,      Dilma Katz MD

## 2024-10-28 NOTE — LETTER
10/28/2024       RE: Payton Agee  [address]  [place] [address]     Dear Colleague,    Thank you for referring your patient, Payton Agee, to the [facility] [place] at [facility]. Please see a copy of my visit note below.    SURGERY CLINIC CONSULTATION    This visit was carried out with an  on the iPad as well as the patient's daughter who was present with her    REASON FOR CONSULTATION:  Payton Agee was referred by Dr. Katz for evaluation and discussion of treatment options for incisional neck pain     HISTORY OF PRESENT ILLNESS:  Payton Agee is a 79 year old female who who underwent a subtotal thyroidectomy with the remaining right partial thyroid by Dr. Cristi Lind at [facility] on November 2023.  The pathology was all benign.  The patient is not on any thyroid hormone replacement as her thyroid function test have been normal.  She has been followed by Dr. Lind and his team in the recent past and the postoperative period.  Following the surgery the patient did complain of neck pain over her anterior neck.  This lasted for several months per her report.  An extensive workup by her endocrine surgery team revealed no occult issues.  She presents to me today for the same complaints.    When asked the patient where the pain is she describes over her anterior throat.  She denies that this has any impact on her daily oral consumption.  It has no impact on her voice quality breathing or swallowing.  She just states that she is sick and is tired and has some intermittent mild anterior neck pain.    No fever or chills.  No concerning lymphadenopathy.  No other symptoms in the head neck area.      REVIEW OF SYSTEMS:  ROS EXAM: 10 point review of systems is pertinent for that noted in the HPI  Patient Active Problem List   Diagnosis     Essential hypertension     Thyroid nodule       No past surgical  Pt is medical   "history on file.    No Known Allergies    Medications reviewed in the EMR        No family history on file.       PHYSICAL EXAM:  Ht 1.575 m (5' 2\")   Wt 80.7 kg (178 lb)   BMI 32.56 kg/m      Neck: There is a well-healed incisional scar just above the collarbones.  The trachea is midline.  There is a small normal amount of thyroid tissue on the right side with no obvious masses.  There is no cervical lymphadenopathy.  I cannot reproduce the pain on palpation.    I personally reviewed the patient's outside medical records including her laboratory data operative reports and radiographic images    ASSESSMENT:   1. Thyroid nodule        PLAN:   At the conclusion of our exam the patient then raised a question about a thyroid nodule.  I reviewed with the patient that her thyroid nodules do not meet criteria for biopsy.  Her previous subtotal thyroidectomy had no malignancy and it.  I think it is reasonable that she repeat an ultrasound in 1 to 2 years and follow-up with endocrinology her or her primary care physician.    Regarding her anterior neck pain if this pain persists then she can be evaluated either by otolaryngology or her thyroid surgeon.    Goldie Solorio MD              Again, thank you for allowing me to participate in the care of your patient.      Sincerely,    Goldie Solorio MD    "

## 2024-10-31 NOTE — PROGRESS NOTES
"SURGERY CLINIC CONSULTATION    This visit was carried out with an  on the iPad as well as the patient's daughter who was present with her    REASON FOR CONSULTATION:  Payton Agee was referred by Dr. Katz for evaluation and discussion of treatment options for incisional neck pain     HISTORY OF PRESENT ILLNESS:  Payton Agee is a 79 year old female who who underwent a subtotal thyroidectomy with the remaining right partial thyroid by Dr. Cristi Lind at [facility] on November 2023.  The pathology was all benign.  The patient is not on any thyroid hormone replacement as her thyroid function test have been normal.  She has been followed by Dr. Lind and his team in the recent past and the postoperative period.  Following the surgery the patient did complain of neck pain over her anterior neck.  This lasted for several months per her report.  An extensive workup by her endocrine surgery team revealed no occult issues.  She presents to me today for the same complaints.    When asked the patient where the pain is she describes over her anterior throat.  She denies that this has any impact on her daily oral consumption.  It has no impact on her voice quality breathing or swallowing.  She just states that she is sick and is tired and has some intermittent mild anterior neck pain.    No fever or chills.  No concerning lymphadenopathy.  No other symptoms in the head neck area.      REVIEW OF SYSTEMS:  ROS EXAM: 10 point review of systems is pertinent for that noted in the HPI  Patient Active Problem List   Diagnosis    Essential hypertension    Thyroid nodule       No past surgical history on file.    No Known Allergies    Medications reviewed in the EMR        No family history on file.       PHYSICAL EXAM:  Ht 1.575 m (5' 2\")   Wt 80.7 kg (178 lb)   BMI 32.56 kg/m      Neck: There is a well-healed incisional scar just above the collarbones.  The trachea is midline.  There is a small normal amount of thyroid " tissue on the right side with no obvious masses.  There is no cervical lymphadenopathy.  I cannot reproduce the pain on palpation.    I personally reviewed the patient's outside medical records including her laboratory data operative reports and radiographic images    ASSESSMENT:   1. Thyroid nodule        PLAN:   At the conclusion of our exam the patient then raised a question about a thyroid nodule.  I reviewed with the patient that her thyroid nodules do not meet criteria for biopsy.  Her previous subtotal thyroidectomy had no malignancy and it.  I think it is reasonable that she repeat an ultrasound in 1 to 2 years and follow-up with endocrinology her or her primary care physician.    Regarding her anterior neck pain if this pain persists then she can be evaluated either by otolaryngology or her thyroid surgeon.    Goldie Solorio MD

## 2024-11-21 ENCOUNTER — OFFICE VISIT (OUTPATIENT)
Dept: PSYCHOLOGY | Facility: CLINIC | Age: 79
End: 2024-11-21
Payer: COMMERCIAL

## 2024-11-21 DIAGNOSIS — F09 COGNITIVE DISORDER: Primary | ICD-10-CM

## 2024-11-21 PROCEDURE — 90834 PSYTX W PT 45 MINUTES: CPT | Performed by: PSYCHOLOGIST

## 2024-11-25 NOTE — PROGRESS NOTES
Behavioral Health Progress Note    Client's Legal Name: Payton Agee    Client's Preferred Name: Payton  YOB: 1945  Type of Service: in-person  Length of Service:   Start time: 8:30    End time: 9:10   Duration: 40 minutes  Attendees: patient and     Identifying Information and Presenting Problem:  Payton is a 79 year old female who was referred due to difficulties with memory and cognition    Treatment Objective(s) Addressed in This Session:  N/a    Progress on / Status of Treatment Objective(s) / Homework:  N/a first visit    Mental Health Screening Questionnaires:  PHQ-9:        No data to display               BROOKLYN-7:        No data to display              Topics Discussed/Interventions Provided:    Subjective Report:  The patient reports significant memory difficulties, including repeated questioning during conversations, misplacing items (e.g., losing and replacing her cane three times in one month), and frequently getting lost in her apartment building. She relies on her neighbor for assistance in scheduling appointments and often seeks help from others for transportation.    The patient immigrated to the [place] on a visa through her son in , and later moved to [place] due to a lack of a Armenian community in [place]. She has no immediate family nearby, though a niece occasionally assists from outside the metro area.    Functioning:    Independent tasks: The patient is able to shower and dress herself.  Challenges: Struggles with grocery shopping, cooking, and maintaining a clean home. Often orders restaurant food.  Medication adherence: Reports taking medications as prescribed but attributes this to having few prescriptions.  Learning difficulties: Attends English classes four days a week but reports minimal progress due to memory challenges and inability to retain information.    Medical History:    Thyroid surgery with subsequent chronic head pain.  Diagnosed  with hypertension and GERD.  Denies history of head injuries or strokes.  No formal schooling as a child, speaks Nigerien, and has very limited literacy in Nigerien.    Living Situation:  The patient lives alone in public housing and relies on a small support network, including her neighbor for appointments and a distant niece for occasional assistance.    Assessment:   The patient s reported cognitive impairments, including memory loss and difficulty with orientation, significantly interfere with her daily functioning and ability to learn new material. Her challenges with independence, limited social support, and linguistic barriers compound the difficulties she faces in meeting the requirements of the citizenship process. A thorough psychological evaluation is warranted to determine the extent of cognitive impairments and their potential impact on her ability to complete the citizenship test.    Mental Status Exam:  During interaction with the provider today, Payton was cooperative, open, engaged, and pleasant. Patient was generally alert and oriented to person place situation. They were casually dressed and appropriately groomed. Patient's attire was appropriate for the weather and occasion. Eye contact fair. Psychomotor functioning:  somewhat restless due to pain and discomfort while seated . Speech was appropriate, difficult to assess due to language barrier and use of . Mood was sad; affect appropriate and mood-congruent. Thought processes: coherent. Thought content: no evidence of psychotic features and no evidence of suicide, homicide, or nonsuicidal self-injury related concerns. Memory appeared grossly abnormal per patient report without being formally evaluated. Insight: fair. Judgment appropriate. Patient exhibited good impulse control during the appointment.     Does the patient appear to be at imminent risk of harm to self/others at this time? No    The session was necessary to address memory  concerns that have been interfering with patient's ability to function in areas related to interacting and relating with others, social relationships, maintaining personal health and physical well-being, day to day self care or health behaviors, participating in meaningful activities, attending and completing tasks, and acquiring and using information. We will further assess memory at next appt    DSM-5-TR Diagnosis:  Cognitive Disorder NOS    Plan:  1. Follow up with this provider 12/9      Maty Mckeon PsyD

## 2024-11-27 ENCOUNTER — TELEPHONE (OUTPATIENT)
Dept: FAMILY MEDICINE | Facility: CLINIC | Age: 79
End: 2024-11-27
Payer: COMMERCIAL

## 2024-11-27 NOTE — TELEPHONE ENCOUNTER
called patient with assistance from an  to discuss transportation resources and other resources patient might be able to access.    Voicemail was left for patient asking for a return call when able.    Patient's listed insurance provides medical rides if they are scheduled 2-3 business days in advance. SW is also able to refer patient to the [facility] for a [place] assessment, though needs a signed ARACELI from patient before the referral is made.    Patient is noted to have an appointment at the clinic, on 12/9/24. ARACELI can be signed at the time of the appointment.    TANYA Yañez

## 2024-12-09 ENCOUNTER — OFFICE VISIT (OUTPATIENT)
Dept: PSYCHOLOGY | Facility: CLINIC | Age: 79
End: 2024-12-09
Payer: COMMERCIAL

## 2024-12-09 DIAGNOSIS — G31.09: Primary | ICD-10-CM

## 2024-12-09 DIAGNOSIS — F02.B0: Primary | ICD-10-CM

## 2024-12-09 PROCEDURE — 90791 PSYCH DIAGNOSTIC EVALUATION: CPT | Performed by: PSYCHOLOGIST

## 2024-12-09 NOTE — PROGRESS NOTES
DIAGNOSTIC EVALUATION  Client's Legal Name: Payton Agee    Client's Preferred Name: Payton  YOB: 1945  Type of Service: in-person  Length of Service:   Start time: 3:00    End time: 3:50   Duration: 50 minutes  Attendees: patient, , and friend from her building     Due to patient being non-English speaking/uses sign language, an  was used for this visit. Only for face-to-face interpretation by an external agency, date and length of interpretation can be found on the scanned worksheet.     name: Nichole  Agency: [employer]  Language: Montenegrin   Telephone number: [phone]  Type of interpretation: Face-to-face, spoken    REFERRAL INFORMATION:  Payton Agee is a 79 year old, Montenegrin female who was referred for cognitive evaluation by her primary care physician, Dr. Katz for mental health diagnosis and to clarify current level of  functioning.    INTERVIEW AND BACKGROUND INFORMATION     PRESENTING PROBLEM:  Payton reports that she is having many difficulties with her memory.  She finds herself asking repeating the same question in conversations, even after someone has answered.  Others become frustrated with her about this.  She states she is frequently misplacing items.  For example, she has lost her cane three times in one month and she has had to replace each time because she can find the cane at all.  She reports getting lost in her apartment building and others that live there help her get back to her apartment.  She forgets about appointments, but has a neighbor that helps her with this.      MEDICAL HISTORY:  Denies any history of a head injury.  Had surgery to remove thyroid nodules and reports she is experiencing chronic head pain.  Current medical diagnoses and medications as listed below.     Patient Active Problem List   Diagnosis    Essential hypertension    Thyroid nodule     Current Outpatient Medications    Medication Sig Dispense Refill    acetaminophen (TYLENOL) 500 MG tablet Take 1-2 tablets (500-1,000 mg) by mouth every 6 hours as needed for mild pain. 180 tablet 3    acetaminophen (TYLENOL) 650 MG CR tablet Take 1 tablet by mouth every 8 hours as needed.      lisinopril-hydrochlorothiazide (ZESTORETIC) 10-12.5 MG tablet Take 1 tablet by mouth daily. 90 tablet 3    loratadine (CLARITIN) 10 MG tablet Take 1 tablet by mouth daily at 2 pm.      omeprazole (PRILOSEC) 40 MG DR capsule Take 1 capsule (40 mg) by mouth 2 times daily. 60 capsule 0    pantoprazole (PROTONIX) 40 MG EC tablet Take 1 tablet (40 mg) by mouth daily. 60 tablet 2    simethicone (MYLICON) 40 MG/0.6ML suspension Take 0.6 mLs (40 mg) by mouth 4 times daily as needed for cramping. 45 mL 5     MENTAL HEALTH:  Payton reports she has not received mental health care in the past.    Outpatient: None  Inpatient: None  Residential: None    Suicide Assessment:  Recent suicidal thoughts: No  Past suicidal thoughts: No  Any attempts in the past: No  Any family/friends/loved ones die by suicide: No  Plan or considering various methods: No  Access to firearms: No  Protective factors: no h/o suicide attempt, no plan or intent, no h/o risky impulsive behavior, commitment to family, Hinduism beliefs, and stable housing    Non-Suicidal Self Injurious Behavior:   No    Violence/Homicide Risk Assessment:  Problems with anger management: No  History of violence: No  History of significant damage to property: No  Threat made to harm or kill someone: No    SUBSTANCE ABUSE  Alcohol Use: no current or previous use  Drug Use: no current or previous use  Prescription Misuse: none  Tobacco Use: none    Patient past attempts to control alcohol/drug use (including informal approaches or formal treatment): N/A  Have there been any consequences related to clients drug use? no.    SOCIAL HISTORY:   Current Living Arrangements: Lives in her own apartment in a public housing  facility.      Family/Children: Has a son that lives in [place], [place].  He sponsored her to come to the [place] and she initially lived in [place] near him.  She felt lonely there as there was not a big social network of other Filipino speaking individuals so she decided she wanted to move to [place].  She has a niece that lives in [place], but sounds like she lives outside the [facility] area.     Intimate Relationship/Marriage: Not in a relationship    Social Connection: Spends time with neighbors in her building.     Developmental History: unknown    Abuse/Trauma: Did not report any    Work: Not currently working    Education: No formal schooling as a child, speaks Filipino, and has very limited literacy in Filipino.     Legal: no current legal problems reported    Cultural/Belief System: Hoahaoism       ACTIVITIES AND SKILLS OF DAILY LIVING:     The patient lives alone in public housing and relies on a small support network, including her neighbor for appointments and a distant niece for occasional assistance. She states she is able to shower and dress herself.  She receives assistance with grocery shopping, cooking, and keeping her house clean.  She forgets things while cooking, so will often order restaurant food.  She reports taking her medications as prescribed, but states this is due to not being on very many medications.  She has been attending English classes four days a week, but reports minimal progress due to her memory and difficulties retaining information.    Patient Strengths and Resources:  Attending classes frequently to try to learn English despite how difficult it has been, socializing with others, has a network that has been helping her at home and to get to appointments    Pertinent Social Determinants of Health:   Payton is impacted by the following social determinants of health: limited English language proficiency, limited education, job opportunities, and/or income, and physical or mental disability    DAWN  COGNITIVE ASSESSMENT RESULTS:  The Jose C Cognitive Assessment is a 30-point test that screens for cognitive impairment.  A score of 26/30 is considered normal.  24 out of the 30 points were administered.  Did not administered the language tasks due to the use of an . Her total score was 4 out of 24 points. This score is indicative of cognitive difficulties.    ORIENTATION:  Payton was oriented to the month and place.  Score received is 2/6    VISUOSPATIAL/EXECUTIVE FUNCTIONING:  Payton was asked to do a task that involved connecting consecutive numbered and lettered dots, copy a 3D bed, and draw a clock.  She received 0/5 points on these tasks.    ABSTRACTION:  On a task where Payton was asked to identify how two words are alike she received 1/2 possible points.    VERBAL FLUENCY: On a task of verbal fluency (naming three animals in [facility]), Payton received 1/3 possible points    MEMORY:  On an immediate recall task, after repeated trials in [facility], Payton was able to recall 1 word out of 5.  On the delayed recall task, she received 0/5 possible points.    ATTENTION/CONCENTRATION: On two tasks of attention (administered in [facility]), Payton received 0/3 possible points.     ASSESSMENT:       Payton Agee is a 79 year old [facility] female who is diagnosed with Major Neurocognitive Disorder based on the clinical interview and testing conducted today.  Neurocognitive disorders lead to cognitive deficits in domains involving attention, concentration, learning, memory, language, and executive skills.  This condition significantly affects the cognitive elements such as good memory, concentration, and mental clarity that are necessary to learn new information and recall that information that would be needed to learn a new language and the civics exam material.  This diagnosis impacts her ability to concentrate and focus which also contributes to her inability to adequately learn the complexity of information  required when one is trying to learn a new language or new facts.  Cognitive testing indicates that Payton is experiencing deficits in attention, concentration, learning, and verbal memory.     Due to the impairments indicated by testing in the area of learning and memory, I do not believe that Payton is able to learn English or the information needed for the civics examination in order to complete the citizenship test.       DIAGNOSIS:  F02.B0  Major Neurocognitive Disorder, moderate without accompanying behavioral disturbance     RECOMMENDATIONS:  1.  Due to the impairments evidenced by the clinical interview and testing, I do not believe that Payton has the ability to learn and memorize the information necessary to take the English test or the Civics examination.  For this reason, the N-648 papers will be completed.    2. I have been in contact with our clinic  to see if her insurance will cover medical rides. He is also looking into any waivers she may qualify for in order to receive assistance with her day to day functioning to care for herself and her home.    3. She will continue to follow with her PCP to manage her current medical conditions.

## 2024-12-09 NOTE — PROGRESS NOTES
Behavioral Health Progress Note    Client's Legal Name: Payton Agee    Client's Preferred Name: Payton  YOB: 1945  Type of Service: in-person  Length of Service:   Start time: 8:30    End time: 9:10   Duration: 40 minutes  Attendees: patient and     Identifying Information and Presenting Problem:  Payton is a 79 year old female who was referred due to difficulties with memory and cognition    Treatment Objective(s) Addressed in This Session:  N/a    Progress on / Status of Treatment Objective(s) / Homework:  N/a first visit    Mental Health Screening Questionnaires:  PHQ-9:        No data to display               BROOKLYN-7:        No data to display              Topics Discussed/Interventions Provided:    Subjective Report:  The patient reports significant memory difficulties, including repeated questioning during conversations, misplacing items (e.g., losing and replacing her cane three times in one month), and frequently getting lost in her apartment building. She relies on her neighbor for assistance in scheduling appointments and often seeks help from others for transportation.    The patient immigrated to the [place] on a visa through her son in [place], [place], and later moved to [place] due to a lack of a Barbadian community in [place]. She has no immediate family nearby, though a niece occasionally assists from outside the metro area.    Functioning:    Independent tasks: The patient is able to shower and dress herself.  Challenges: Struggles with grocery shopping, cooking, and maintaining a clean home. Often orders restaurant food.  Medication adherence: Reports taking medications as prescribed but attributes this to having few prescriptions.  Learning difficulties: Attends English classes four days a week but reports minimal progress due to memory challenges and inability to retain information.    Medical History:    Thyroid surgery with subsequent chronic head pain.  Diagnosed  with hypertension and GERD.  Denies history of head injuries or strokes.  No formal schooling as a child, speaks Cambodian, and has very limited literacy in Cambodian.    Living Situation:  The patient lives alone in public housing and relies on a small support network, including her neighbor for appointments and a distant niece for occasional assistance.    Assessment:   The patient s reported cognitive impairments, including memory loss and difficulty with orientation, significantly interfere with her daily functioning and ability to learn new material. Her challenges with independence, limited social support, and linguistic barriers compound the difficulties she faces in meeting the requirements of the citizenship process. A thorough psychological evaluation is warranted to determine the extent of cognitive impairments and their potential impact on her ability to complete the citizenship test.    Mental Status Exam:  During interaction with the provider today, Payton was cooperative, open, engaged, and pleasant. Patient was generally alert and oriented to person place situation. They were casually dressed and appropriately groomed. Patient's attire was appropriate for the weather and occasion. Eye contact fair. Psychomotor functioning:  somewhat restless due to pain and discomfort while seated . Speech was appropriate, difficult to assess due to language barrier and use of . Mood was sad; affect appropriate and mood-congruent. Thought processes: coherent. Thought content: no evidence of psychotic features and no evidence of suicide, homicide, or nonsuicidal self-injury related concerns. Memory appeared grossly abnormal per patient report without being formally evaluated. Insight: fair. Judgment appropriate. Patient exhibited good impulse control during the appointment.     Does the patient appear to be at imminent risk of harm to self/others at this time? No    The session was necessary to address memory  concerns that have been interfering with patient's ability to function in areas related to interacting and relating with others, social relationships, maintaining personal health and physical well-being, day to day self care or health behaviors, participating in meaningful activities, attending and completing tasks, and acquiring and using information. We will further assess memory at next appt    DSM-5-TR Diagnosis:  Cognitive Disorder NOS    Plan:  1. Follow up with this provider 12/9      Maty Mckeon PsyD

## 2024-12-12 ENCOUNTER — CARE COORDINATION (OUTPATIENT)
Dept: FAMILY MEDICINE | Facility: CLINIC | Age: 79
End: 2024-12-12

## 2024-12-18 ENCOUNTER — TELEPHONE (OUTPATIENT)
Dept: PSYCHOLOGY | Facility: CLINIC | Age: 79
End: 2024-12-18
Payer: COMMERCIAL

## 2024-12-18 NOTE — TELEPHONE ENCOUNTER
Completed N-648 form today and called Payton's friend from her building as requested by Payton to let her know it is at the  and available for .    Loli [phone]

## 2024-12-19 ENCOUNTER — TELEPHONE (OUTPATIENT)
Dept: FAMILY MEDICINE | Facility: CLINIC | Age: 79
End: 2024-12-19
Payer: COMMERCIAL

## 2024-12-19 NOTE — PROGRESS NOTES
planned to meet with patient today after seeing PCP to get [facility]. Appointment was canceled so ARACELI was not obtained.    TANYA Yañez

## 2024-12-19 NOTE — TELEPHONE ENCOUNTER
SW left patient a voicemail this morning with assistance from an  in attempt to discuss ARACELI needed for SW to make referral for additional services.    SW asked for a call back when able.    TANYA Yañez

## 2025-07-29 ENCOUNTER — APPOINTMENT (OUTPATIENT)
Dept: CT IMAGING | Facility: CLINIC | Age: 80
End: 2025-07-29
Attending: EMERGENCY MEDICINE
Payer: COMMERCIAL

## 2025-07-29 ENCOUNTER — APPOINTMENT (OUTPATIENT)
Dept: GENERAL RADIOLOGY | Facility: CLINIC | Age: 80
End: 2025-07-29
Attending: EMERGENCY MEDICINE
Payer: COMMERCIAL

## 2025-07-29 ENCOUNTER — HOSPITAL ENCOUNTER (EMERGENCY)
Facility: CLINIC | Age: 80
Discharge: HOME | End: 2025-07-29
Attending: EMERGENCY MEDICINE
Payer: COMMERCIAL

## 2025-07-29 VITALS
HEART RATE: 66 BPM | OXYGEN SATURATION: 96 % | DIASTOLIC BLOOD PRESSURE: 68 MMHG | TEMPERATURE: 97.7 F | HEIGHT: 66 IN | BODY MASS INDEX: 28.73 KG/M2 | RESPIRATION RATE: 18 BRPM | SYSTOLIC BLOOD PRESSURE: 137 MMHG
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
## 2025-07-29 PROCEDURE — 99283 EMERGENCY DEPT VISIT LOW MDM: CPT | Performed by: EMERGENCY MEDICINE

## 2025-07-29 PROCEDURE — 250N000013 HC RX MED GY IP 250 OP 250 PS 637: Performed by: EMERGENCY MEDICINE

## 2025-07-29 PROCEDURE — 72125 CT NECK SPINE W/O DYE: CPT

## 2025-07-29 PROCEDURE — 70450 CT HEAD/BRAIN W/O DYE: CPT

## 2025-07-29 PROCEDURE — 73560 X-RAY EXAM OF KNEE 1 OR 2: CPT | Mod: 50

## 2025-07-29 PROCEDURE — 70486 CT MAXILLOFACIAL W/O DYE: CPT

## 2025-07-29 PROCEDURE — 99284 EMERGENCY DEPT VISIT MOD MDM: CPT | Mod: 25 | Performed by: EMERGENCY MEDICINE

## 2025-07-29 RX ORDER — ACETAMINOPHEN 325 MG/1
325-650 TABLET ORAL EVERY 6 HOURS PRN
Qty: 30 TABLET | Refills: 0 | Status: SHIPPED | OUTPATIENT
Start: 2025-07-29

## 2025-07-29 RX ORDER — ACETAMINOPHEN 500 MG
1000 TABLET ORAL ONCE
Status: COMPLETED | OUTPATIENT
Start: 2025-07-29 | End: 2025-07-29

## 2025-07-29 RX ADMIN — ACETAMINOPHEN 1000 MG: 500 TABLET ORAL at 12:45

## 2025-07-29 ASSESSMENT — ACTIVITIES OF DAILY LIVING (ADL)
ADLS_ACUITY_SCORE: 41

## 2025-07-29 ASSESSMENT — COLUMBIA-SUICIDE SEVERITY RATING SCALE - C-SSRS
1. IN THE PAST MONTH, HAVE YOU WISHED YOU WERE DEAD OR WISHED YOU COULD GO TO SLEEP AND NOT WAKE UP?: NO
6. HAVE YOU EVER DONE ANYTHING, STARTED TO DO ANYTHING, OR PREPARED TO DO ANYTHING TO END YOUR LIFE?: NO
2. HAVE YOU ACTUALLY HAD ANY THOUGHTS OF KILLING YOURSELF IN THE PAST MONTH?: NO

## 2025-07-29 NOTE — ED TRIAGE NOTES
Pt tripped on the sidewalk. Pt fell to her knees and scraped her forehead. No LOC. Pt not on blood thinners. VSS. Pt states that she recently had surgery on her knees. Pt states she is in a lot of pain due to the fall.     Triage Assessment (Adult)       Row Name 07/29/25 1221          Triage Assessment    Airway WDL WDL        Respiratory WDL    Respiratory WDL WDL        Skin Circulation/Temperature WDL    Skin Circulation/Temperature WDL WDL        Cardiac WDL    Cardiac WDL WDL        Peripheral/Neurovascular WDL    Peripheral Neurovascular WDL WDL        Cognitive/Neuro/Behavioral WDL    Cognitive/Neuro/Behavioral WDL WDL

## 2025-07-29 NOTE — ED NOTES
Bed: ED07  Expected date: 7/29/25  Expected time: 12:10 PM  Means of arrival: Ambulance  Comments:  Hems [phone] f facial abrasion

## 2025-07-29 NOTE — ED PROVIDER NOTES
[facility] [place]    7/29/25      ED PROVIDER NOTE   ED 7 12:33 PM   History     Chief Complaint   Patient presents with    Abrasion     Pt tripped on the sidewalk. Pt fell to her knees and scraped her forehead. No LOC. Pt not on blood thinners. VSS.     Fall     The history is provided by the patient and medical records. A  was used (professional [employer]  via iPad).     Payton Agee is a 80 year old female who presents to the Emergency Department after a mechanical trip and fall today. She was out for a walk when she tripped and fell forward onto the sidewalk. She landed on her knees and struck her face and forehead against the ground. She doesn't think anything is broken, but notes that she did strike her teeth in the fall as well and has dental pain.  No oral bleeding. No loss of consciousness, not on anticoagulation. She reports pain in multiple sites, particularly to her forehead, the rest of her head, teeth, neck, back, bilateral knees.  She also notes distant history of thyroid surgery (per epic records, this was a subtotal thyroidectomy in 2023) as well as bilateral knee replacements, is concerned that the fall may have led to some complications of these previously operated sites.  She has not taken any medications for her pain yet. Patient is on GERD and hypertension medications, no other medications.    Past Medical History  Past Medical History:   Diagnosis Date    Essential hypertension 10/06/2023    Thyroid nodule 10/06/2023     No past surgical history on file.  acetaminophen (TYLENOL) 500 MG tablet  acetaminophen (TYLENOL) 650 MG CR tablet  lisinopril-hydrochlorothiazide (ZESTORETIC) 10-12.5 MG tablet  loratadine (CLARITIN) 10 MG tablet  omeprazole (PRILOSEC) 40 MG DR capsule  pantoprazole (PROTONIX) 40 MG EC tablet  simethicone (MYLICON) 40 MG/0.6ML suspension      No Known Allergies  Family History  No family history  "on file.  Social History   Social History     Tobacco Use    Smoking status: Never    Smokeless tobacco: Never      A medically appropriate review of systems was performed with pertinent positives and negatives noted in the HPI, and all other systems negative.    Physical Exam   BP: 135/68  Pulse: 66  Temp: 97.7  F (36.5  C)  Resp: 18  Height: 167.6 cm (5' 6\")  SpO2: 96 %    Physical Exam  Physical Exam   Constitutional: oriented to person, place, and time. appears well-developed and well-nourished.   HENT:   Head: Abrasion and hematoma over the forehead, no lacerations or bleeding.  Extraocular muscles normal.  Pupils equal react to light.  No parable ecchymosis.  No hemotympanum.  Neck: Normal range of motion.  Tender palpation over the right paraspinous muscles.  No midline tenderness.  Pulmonary/Chest: Effort normal. No respiratory distress.   Cardiac: No murmurs, rubs, gallops. RRR.  Abdominal: Abdomen soft, nontender, nondistended. No rebound tenderness.  MSK: Long bones without deformity or evidence of trauma  Neurological: alert and oriented to person, place, and time.  Stable gait.  No focal defects.  Sensation and strength symmetric and intact in the upper and lower extremities.  Skin: Skin is warm and dry.   Psychiatric:  normal mood and affect.  behavior is normal. Thought content normal.      ED Course, Procedures, & Data      Procedures             Medications   acetaminophen (TYLENOL) tablet 1,000 mg (1,000 mg Oral $Given 7/29/25 0548)          Critical care was not performed.     Medical Decision Making  The patient's presentation was of high complexity (an acute health issue posing potential threat to life or bodily function).    The patient's evaluation involved:  ordering and/or review of 3+ test(s) in this encounter (see separate area of note for details)    The patient's management necessitated only low risk treatment.    Assessment & Plan    Patient resenting after mechanical fall.  No " cardiorespiratory symptoms prior to or after the event.  She did hit her head, CT and other imaging are reassuring.  No abdominal tenderness.  No other tenderness of her extremities or chest wall.  Patient appears well and is stable vital signs.  Patient will be discharged with conservative measures.  Discussed return precautions.    I have reviewed the nursing notes. I have reviewed the findings, diagnosis, plan and need for follow up with the patient.    New Prescriptions    ACETAMINOPHEN (TYLENOL) 325 MG TABLET    Take 1-2 tablets (325-650 mg) by mouth every 6 hours as needed.    MENTHOL, TOPICAL ANALGESIC, 2.5% (BENGAY VANISHING SCENT) 2.5 % GEL TOPICAL GEL    Apply topically every 6 hours as needed for moderate pain.       Final diagnoses:   Fall, initial encounter     I, Jodi Thapa, am serving as a trained medical scribe to document services personally performed by Evelio Plunkett MD based on the provider's statements to me on July 29, 2025.  This document has been checked and approved by the attending provider.    I, Evelio Plunkett MD, was physically present and have reviewed and verified the accuracy of this note documented by Jodi Thapa, medical scribe.      Evelio Plunkett MD   [facility] EMERGENCY DEPARTMENT  7/29/2025        Evelio Plunkett MD  07/29/25 1600

## 2025-07-29 NOTE — DISCHARGE INSTRUCTIONS
Return to the emergency department if you develop worsening symptoms or if you have any further concerns.    Please follow-up in 1 week with your primary care provider if symptoms or not improving.